# Patient Record
Sex: MALE | URBAN - METROPOLITAN AREA
[De-identification: names, ages, dates, MRNs, and addresses within clinical notes are randomized per-mention and may not be internally consistent; named-entity substitution may affect disease eponyms.]

---

## 2022-12-27 ENCOUNTER — WEB ENCOUNTER (OUTPATIENT)
Dept: URBAN - METROPOLITAN AREA CLINIC 113 | Facility: CLINIC | Age: 72
End: 2022-12-27

## 2022-12-30 ENCOUNTER — OFFICE VISIT (OUTPATIENT)
Dept: URBAN - METROPOLITAN AREA CLINIC 113 | Facility: CLINIC | Age: 72
End: 2022-12-30
Payer: MEDICARE

## 2022-12-30 ENCOUNTER — LAB OUTSIDE AN ENCOUNTER (OUTPATIENT)
Dept: URBAN - METROPOLITAN AREA CLINIC 113 | Facility: CLINIC | Age: 72
End: 2022-12-30

## 2022-12-30 VITALS
WEIGHT: 268 LBS | DIASTOLIC BLOOD PRESSURE: 84 MMHG | BODY MASS INDEX: 39.69 KG/M2 | SYSTOLIC BLOOD PRESSURE: 118 MMHG | TEMPERATURE: 97.8 F | HEIGHT: 69 IN | HEART RATE: 81 BPM | RESPIRATION RATE: 18 BRPM

## 2022-12-30 DIAGNOSIS — E11.9 TYPE 2 DIABETES MELLITUS WITHOUT COMPLICATION, WITHOUT LONG-TERM CURRENT USE OF INSULIN: ICD-10-CM

## 2022-12-30 DIAGNOSIS — R13.19 ESOPHAGEAL DYSPHAGIA: ICD-10-CM

## 2022-12-30 DIAGNOSIS — K21.9 GERD WITHOUT ESOPHAGITIS: ICD-10-CM

## 2022-12-30 DIAGNOSIS — Z91.81 RISK FOR FALLS: ICD-10-CM

## 2022-12-30 DIAGNOSIS — E66.9 OBESITY (BMI 35.0-39.9 WITHOUT COMORBIDITY): ICD-10-CM

## 2022-12-30 DIAGNOSIS — K31.84 GASTROPARESIS: ICD-10-CM

## 2022-12-30 DIAGNOSIS — R79.89 ELEVATED LFTS: ICD-10-CM

## 2022-12-30 DIAGNOSIS — Z86.010 HISTORY OF COLON POLYPS: ICD-10-CM

## 2022-12-30 PROCEDURE — 99204 OFFICE O/P NEW MOD 45 MIN: CPT | Performed by: NURSE PRACTITIONER

## 2022-12-30 RX ORDER — ATORVASTATIN CALCIUM 10 MG/1
1 TABLET TABLET, FILM COATED ORAL ONCE A DAY
Status: ACTIVE | COMMUNITY

## 2022-12-30 RX ORDER — GLIPIZIDE 10 MG/1
1 TABLET 30 MINUTES BEFORE BREAKFAST TABLET ORAL ONCE A DAY
Status: ACTIVE | COMMUNITY

## 2022-12-30 RX ORDER — HYDROCHLOROTHIAZIDE 25 MG/1
1 TABLET IN THE MORNING TABLET ORAL ONCE A DAY
Status: ACTIVE | COMMUNITY

## 2022-12-30 RX ORDER — CYCLOBENZAPRINE HYDROCHLORIDE 10 MG/1
1 TABLET AT BEDTIME AS NEEDED TABLET, FILM COATED ORAL ONCE A DAY
Status: ACTIVE | COMMUNITY

## 2022-12-30 RX ORDER — OMEPRAZOLE 20 MG/1
1 CAPSULE 30 MINUTES BEFORE MORNING MEAL CAPSULE, DELAYED RELEASE ORAL ONCE A DAY
Status: ACTIVE | COMMUNITY

## 2022-12-30 RX ORDER — ZOLPIDEM TARTRATE 5 MG/1
1 TABLET AT BEDTIME TABLET, FILM COATED ORAL ONCE A DAY
Status: ACTIVE | COMMUNITY

## 2022-12-30 RX ORDER — SITAGLIPTIN 100 MG/1
1 TABLET TABLET, FILM COATED ORAL ONCE A DAY
Status: ACTIVE | COMMUNITY

## 2022-12-30 NOTE — HPI-TODAY'S VISIT:
72-year-old gentleman presenting for evaluation of dysphagia.  He has a history of HTN, fluid retention on HCTZ, insomnia on Ambien, HLD on atorvastatin, and GERD on omeprazole 20 mg daily. HE tells me that she recently had a CT of the chest, for evaluation of shortness of breath, voice hoarseness and sore throat. Incidentally, he was found to have a 2 cm cyst on his liver. The scan was completed in [place], [place]. He has been following with [facility] in [place]. He provides a "problem list" from [facility]. Problems include: Type 2 DM, HTN, obesity with BMI 39-39.9, GERD, HLD gastroparesis, insomnia, sedative dependence, hypercalcemia, and dysphagia. He tells me that he was referred to a gastroenterologist in [place], but does not want to receive his care down there. His papers provide labs from 12/11/22. His ALP is 105, ALT 50, Tbili 1.2, AST not checked, A1c 6.7, Plt 174, Hg 15.5.  He tells me that he was seeing a gastroenterologist in [place], [place]; Dr. Charlie Thornton It was Dr. Thornton who diagnosed his gastroparesis. He has had multiple EGD and colonoscopies. He once had an EGD to place a pill camera in to the small intestines. His last colonoscopy was about 5 to 10 years ago, notable for the removal of about 8 polyps.  His primary provider in [place] is Farzana Najera at [facility] (phone: [phone]). She has ordered an ultrasound to further evaluate this liver lesion. I will request a copy of labs and imaging, once available. He tells me that he is also planned for an echocardiogram, non treadmill stress test, and pulmonary function testing in [place].   He is in the process of moving to [place], awaiting acceptance into an assisted living facility.   He admits difficulty with swallowing. There is no trouble with liquids. He has trouble swallowing large pills. He sometimes has trouble with solid foods, usually depending on what he eats. He has trouble with swallowing steaks. He has trouble with digesting food and pills associated with bloating, nausea, etc. There is heartburn characterized "as so bad he called 911 thinking he was having a heart attack." He has heartburn almost every night. He sleeps well in his recliner. He admits vomiting, which is typically food contents. This does not occur regularly. He has bowel movements erratically. Usually he will have 2 or 3 stools per day, occasionally he will skip a day. He has had 5 bowel movements today, which have all been liquid. There is no blood per rectum.

## 2023-01-04 ENCOUNTER — WEB ENCOUNTER (OUTPATIENT)
Dept: URBAN - METROPOLITAN AREA CLINIC 113 | Facility: CLINIC | Age: 73
End: 2023-01-04

## 2023-01-04 RX ORDER — POLYETHYLENE GLYCOL 3350, SODIUM CHLORIDE, SODIUM BICARBONATE, POTASSIUM CHLORIDE 420; 11.2; 5.72; 1.48 G/4L; G/4L; G/4L; G/4L
420 G POWDER, FOR SOLUTION ORAL ONCE
Qty: 420 G | Refills: 0 | OUTPATIENT
Start: 2023-01-05 | End: 2023-01-06

## 2023-01-07 LAB
% SATURATION: 24
A/G RATIO: 1.2
ABSOLUTE BASOPHILS: 59
ABSOLUTE EOSINOPHILS: 129
ABSOLUTE LYMPHOCYTES: 1416
ABSOLUTE MONOCYTES: 608
ABSOLUTE NEUTROPHILS: 9489
AFP, SERUM, TUMOR MARKER: 7.7
ALBUMIN: 4.2
ALKALINE PHOSPHATASE: 107
ALPHA-1-ANTITRYPSIN (AAT) PHENOTYPE: (no result)
ALT (SGPT): 38
ANA SCREEN, IFA: POSITIVE
AST (SGOT): 32
BASOPHILS: 0.5
BILIRUBIN, TOTAL: 1.6
BUN/CREATININE RATIO: (no result)
BUN: 16
CALCIUM: 9.8
CARBON DIOXIDE, TOTAL: 28
CHLORIDE: 101
CREATININE: 0.94
EGFR: 87
EOSINOPHILS: 1.1
FERRITIN: 49
GLOBULIN, TOTAL: 3.4
GLUCOSE: 170
HEMATOCRIT: 47.7
HEMOGLOBIN: 16.9
HEPATITIS A AB, TOTAL: REACTIVE
HEPATITIS B CORE AB TOTAL: (no result)
HEPATITIS B SURFACE AB IMMUNITY, QN: <5
HEPATITIS B SURFACE ANTIGEN: (no result)
HEPATITIS C ANTIBODY: (no result)
INDEX: 0.05
IRON BINDING CAPACITY: 372
IRON, TOTAL: 88
LYMPHOCYTES: 12.1
MCH: 31.1
MCHC: 35.4
MCV: 87.7
MITOCHONDRIAL (M2) ANTIBODY: <=20
MONOCYTES: 5.2
MPV: 12.6
NEUTROPHILS: 81.1
PLATELET COUNT: 171
POTASSIUM: 4.1
PROTEIN, TOTAL: 7.6
RDW: 13.2
RED BLOOD CELL COUNT: 5.44
SODIUM: 140
WHITE BLOOD CELL COUNT: 11.7

## 2023-01-09 ENCOUNTER — TELEPHONE ENCOUNTER (OUTPATIENT)
Dept: URBAN - METROPOLITAN AREA CLINIC 113 | Facility: CLINIC | Age: 73
End: 2023-01-09

## 2023-01-12 ENCOUNTER — LAB OUTSIDE AN ENCOUNTER (OUTPATIENT)
Dept: URBAN - METROPOLITAN AREA CLINIC 113 | Facility: CLINIC | Age: 73
End: 2023-01-12

## 2023-01-12 ENCOUNTER — DASHBOARD ENCOUNTERS (OUTPATIENT)
Age: 73
End: 2023-01-12

## 2023-01-12 ENCOUNTER — OFFICE VISIT (OUTPATIENT)
Dept: URBAN - METROPOLITAN AREA CLINIC 113 | Facility: CLINIC | Age: 73
End: 2023-01-12
Payer: MEDICARE

## 2023-01-12 VITALS
BODY MASS INDEX: 39.55 KG/M2 | WEIGHT: 267 LBS | TEMPERATURE: 98.2 F | HEART RATE: 95 BPM | HEIGHT: 69 IN | SYSTOLIC BLOOD PRESSURE: 113 MMHG | RESPIRATION RATE: 16 BRPM | DIASTOLIC BLOOD PRESSURE: 63 MMHG

## 2023-01-12 DIAGNOSIS — E66.9 OBESITY (BMI 35.0-39.9 WITHOUT COMORBIDITY): ICD-10-CM

## 2023-01-12 DIAGNOSIS — R13.19 ESOPHAGEAL DYSPHAGIA: ICD-10-CM

## 2023-01-12 DIAGNOSIS — K21.9 GERD WITHOUT ESOPHAGITIS: ICD-10-CM

## 2023-01-12 DIAGNOSIS — R79.89 ELEVATED LFTS: ICD-10-CM

## 2023-01-12 DIAGNOSIS — Z91.81 RISK FOR FALLS: ICD-10-CM

## 2023-01-12 DIAGNOSIS — K31.84 GASTROPARESIS: ICD-10-CM

## 2023-01-12 DIAGNOSIS — E11.9 TYPE 2 DIABETES MELLITUS WITHOUT COMPLICATION, WITHOUT LONG-TERM CURRENT USE OF INSULIN: ICD-10-CM

## 2023-01-12 DIAGNOSIS — Z86.010 HISTORY OF COLON POLYPS: ICD-10-CM

## 2023-01-12 PROBLEM — 162864005: Status: ACTIVE | Noted: 2022-12-30

## 2023-01-12 PROBLEM — 235675006: Status: ACTIVE | Noted: 2022-12-30

## 2023-01-12 PROBLEM — 313436004: Status: ACTIVE | Noted: 2022-12-30

## 2023-01-12 PROBLEM — 266435005: Status: ACTIVE | Noted: 2022-12-30

## 2023-01-12 PROBLEM — 428283002: Status: ACTIVE | Noted: 2022-12-30

## 2023-01-12 PROCEDURE — 99214 OFFICE O/P EST MOD 30 MIN: CPT | Performed by: INTERNAL MEDICINE

## 2023-01-12 RX ORDER — SITAGLIPTIN 100 MG/1
1 TABLET TABLET, FILM COATED ORAL ONCE A DAY
Status: ACTIVE | COMMUNITY

## 2023-01-12 RX ORDER — GLIPIZIDE 10 MG/1
1 TABLET 30 MINUTES BEFORE BREAKFAST TABLET ORAL ONCE A DAY
Status: ACTIVE | COMMUNITY

## 2023-01-12 RX ORDER — ZOLPIDEM TARTRATE 5 MG/1
1 TABLET AT BEDTIME TABLET, FILM COATED ORAL ONCE A DAY
Status: ACTIVE | COMMUNITY

## 2023-01-12 RX ORDER — OMEPRAZOLE 20 MG/1
1 CAPSULE 30 MINUTES BEFORE MORNING MEAL CAPSULE, DELAYED RELEASE ORAL ONCE A DAY
Status: ACTIVE | COMMUNITY

## 2023-01-12 RX ORDER — ATORVASTATIN CALCIUM 10 MG/1
1 TABLET TABLET, FILM COATED ORAL ONCE A DAY
Status: ACTIVE | COMMUNITY

## 2023-01-12 RX ORDER — CYCLOBENZAPRINE HYDROCHLORIDE 10 MG/1
1 TABLET AT BEDTIME AS NEEDED TABLET, FILM COATED ORAL ONCE A DAY
Status: ACTIVE | COMMUNITY

## 2023-01-12 RX ORDER — HYDROCHLOROTHIAZIDE 25 MG/1
1 TABLET IN THE MORNING TABLET ORAL ONCE A DAY
Status: ACTIVE | COMMUNITY

## 2023-01-12 NOTE — HPI-TODAY'S VISIT:
72-year-old gentleman presenting for follow up evaluation of dysphagia, elevated LFTs and a history of colon polyps.  He was last seen here by Ever Olivares on 12/30/22.  He has a history of obesity, HTN, fluid retention on HCTZ, insomnia on Ambien, HLD on atorvastatin, and GERD on omeprazole 20 mg daily. He recently had a CT of the chest, for evaluation of shortness of breath, voice hoarseness and sore throat and was incidentally found to have a 2 cm cyst on his liver. The scan was completed in [place], [place]. He has been following with [facility] in [place]. At his visit on 12/30/22, he provided a "problem list" from [facility]. Problems include: Type 2 DM, HTN, obesity with BMI 39-39.9, GERD, HLD gastroparesis, insomnia, sedative dependence, hypercalcemia, and dysphagia. He  was referred to a gastroenterologist in [place], but did not want to receive his care there. His labs from 12/11/22 were notable for Alkaline phosphatase of 105, ALT 50, Tbili 1.2, AST not checked, A1c 6.7, Plt 174, HgB 15.5.  He previously was seen by Dr. Bud Thornton in [place], who diagnosed his gastroparesis. He has had multiple EGD and colonoscopies. He once had an EGD to place a pill camera in to the small intestines. His last colonoscopy was 5 to 10 years ago, notable for the removal of ~8 polyps.  His primary provider in [place] is Farzana Najera at [facility] (phone: [phone]). She has ordered an ultrasound to further evaluate this liver lesion. I will request a copy of labs and imaging, once available. He tells me that he is also planned for an echocardiogram, non treadmill stress test, and pulmonary function testing in [place].   He is in the process of moving to [place], awaiting acceptance into an assisted living facility.   At his last visit, he admitted difficulty with swallowing (solids only), particularly large pills. He sometimes has trouble with solid food, usually depending on what he eats. He has trouble with swallowing steaks. He has trouble with digesting food and pills associated with bloating, nausea, etc. There is heartburn characterized "as so bad he called 911 thinking he was having a heart attack." He has heartburn nightly. He sleeps well in his recliner. He has had intermittent problems with vomiting, typically food. Bowel habits are irregular. Usually he will have 2 or 3 stools per day, but occasionally will skip a day. He has seen no blood per rectum.  After his 12/30/2022 visit, he had labs ordered including a complete metabolic panel, iron studies, hepatitis serologies, autoimmune studies, and an alpha-fetoprotein.  His AFP was slightly elevated at 7.7, and it was recommended that he obtain an MRI of the liver as opposed to an ultrasound.  Antinuclear antibody is weakly positive at 1-40 titer, but hepatitis B and C serologies were negative.  Iron/TIBC saturation was 24%, ferritin 49, albumin 4.2, AST 32, ALT 38.  His total bilirubin was slightly elevated at 1.6.  The patient was to be scheduled for both upper endoscopy with dilatation and colonoscopy once cleared by cardiology. He was appraently to undergo an echocardiogram and an ETT by cardiology, but this has still not been scheduled.  His MRI has not yet been done as well. It is scheduled for 1/23/23.

## 2023-02-28 ENCOUNTER — OFFICE VISIT (OUTPATIENT)
Dept: URBAN - METROPOLITAN AREA CLINIC 113 | Facility: CLINIC | Age: 73
End: 2023-02-28

## 2023-04-05 ENCOUNTER — WEB ENCOUNTER (OUTPATIENT)
Dept: URBAN - METROPOLITAN AREA CLINIC 113 | Facility: CLINIC | Age: 73
End: 2023-04-05

## 2023-05-04 ENCOUNTER — TELEPHONE ENCOUNTER (OUTPATIENT)
Dept: URBAN - METROPOLITAN AREA CLINIC 113 | Facility: CLINIC | Age: 73
End: 2023-05-04

## 2023-05-15 ENCOUNTER — OFFICE VISIT (OUTPATIENT)
Dept: URBAN - METROPOLITAN AREA SURGERY CENTER 25 | Facility: SURGERY CENTER | Age: 73
End: 2023-05-15

## 2023-06-15 ENCOUNTER — TELEPHONE ENCOUNTER (OUTPATIENT)
Dept: URBAN - METROPOLITAN AREA CLINIC 113 | Facility: CLINIC | Age: 73
End: 2023-06-15

## 2023-06-16 ENCOUNTER — OFFICE VISIT (OUTPATIENT)
Dept: URBAN - METROPOLITAN AREA MEDICAL CENTER 19 | Facility: MEDICAL CENTER | Age: 73
End: 2023-06-16
Payer: MEDICARE

## 2023-06-16 DIAGNOSIS — R13.14 DYSPHAGIA, PHARYNGOESOPHAGEAL: ICD-10-CM

## 2023-06-16 PROCEDURE — 43248 EGD GUIDE WIRE INSERTION: CPT | Performed by: INTERNAL MEDICINE

## 2023-06-23 ENCOUNTER — TELEPHONE ENCOUNTER (OUTPATIENT)
Dept: URBAN - METROPOLITAN AREA CLINIC 113 | Facility: CLINIC | Age: 73
End: 2023-06-23

## 2023-06-23 RX ORDER — PEG-3350, SODIUM SULFATE, SODIUM CHLORIDE, POTASSIUM CHLORIDE, SODIUM ASCORBATE AND ASCORBIC ACID 7.5-2.691G
AS DIRECTED KIT ORAL
OUTPATIENT
Start: 2023-06-26

## 2023-07-12 ENCOUNTER — TELEPHONE ENCOUNTER (OUTPATIENT)
Dept: URBAN - METROPOLITAN AREA CLINIC 113 | Facility: CLINIC | Age: 73
End: 2023-07-12

## 2023-07-12 RX ORDER — PEG-3350, SODIUM SULFATE, SODIUM CHLORIDE, POTASSIUM CHLORIDE, SODIUM ASCORBATE AND ASCORBIC ACID 7.5-2.691G
AS DIRECTED KIT ORAL
Start: 2023-06-26

## 2023-07-13 ENCOUNTER — TELEPHONE ENCOUNTER (OUTPATIENT)
Dept: URBAN - METROPOLITAN AREA CLINIC 113 | Facility: CLINIC | Age: 73
End: 2023-07-13

## 2023-07-20 ENCOUNTER — OFFICE VISIT (OUTPATIENT)
Dept: URBAN - METROPOLITAN AREA SURGERY CENTER 25 | Facility: SURGERY CENTER | Age: 73
End: 2023-07-20

## 2023-09-07 ENCOUNTER — OFFICE VISIT (OUTPATIENT)
Dept: URBAN - METROPOLITAN AREA MEDICAL CENTER 2 | Facility: MEDICAL CENTER | Age: 73
End: 2023-09-07

## 2023-10-20 ENCOUNTER — TELEPHONE ENCOUNTER (OUTPATIENT)
Dept: URBAN - METROPOLITAN AREA CLINIC 113 | Facility: CLINIC | Age: 73
End: 2023-10-20